# Patient Record
Sex: MALE | Race: BLACK OR AFRICAN AMERICAN | Employment: FULL TIME | URBAN - METROPOLITAN AREA
[De-identification: names, ages, dates, MRNs, and addresses within clinical notes are randomized per-mention and may not be internally consistent; named-entity substitution may affect disease eponyms.]

---

## 2018-12-01 ENCOUNTER — HOSPITAL ENCOUNTER (EMERGENCY)
Age: 48
Discharge: HOME | End: 2018-12-01
Attending: EMERGENCY MEDICINE

## 2018-12-01 VITALS
RESPIRATION RATE: 16 BRPM | WEIGHT: 168.13 LBS | SYSTOLIC BLOOD PRESSURE: 115 MMHG | TEMPERATURE: 98.5 F | DIASTOLIC BLOOD PRESSURE: 74 MMHG | OXYGEN SATURATION: 97 % | HEART RATE: 68 BPM | BODY MASS INDEX: 26.33 KG/M2

## 2018-12-01 DIAGNOSIS — G51.0 BELL'S PALSY: Primary | ICD-10-CM

## 2018-12-01 PROCEDURE — 6370000000 HC RX 637 (ALT 250 FOR IP): Performed by: EMERGENCY MEDICINE

## 2018-12-01 PROCEDURE — 99284 EMERGENCY DEPT VISIT MOD MDM: CPT

## 2018-12-01 PROCEDURE — 86618 LYME DISEASE ANTIBODY: CPT

## 2018-12-01 RX ORDER — PREDNISONE 20 MG/1
60 TABLET ORAL ONCE
Status: COMPLETED | OUTPATIENT
Start: 2018-12-01 | End: 2018-12-01

## 2018-12-01 RX ORDER — PREDNISONE 20 MG/1
60 TABLET ORAL DAILY
Qty: 18 TABLET | Refills: 0 | Status: SHIPPED | OUTPATIENT
Start: 2018-12-01 | End: 2018-12-07

## 2018-12-01 RX ADMIN — PREDNISONE 60 MG: 20 TABLET ORAL at 14:50

## 2018-12-01 NOTE — ED NOTES
Dr. Valery Brady in room at this time. Per MD, pt not a stroke alert.       Kenneth Aguilar RN  12/01/18 DORA Matt  12/01/18 [id number]

## 2018-12-01 NOTE — ED NOTES
Patient given d/c instructions with return verbalization including medication. Emphasis on f/u with neurologist, name given to patient. Reviewed instructions for Bell's Palsy. Emphasis on usinf NS eye drops and to frequently close lid using hand if needed. Pt given eye patch to use at night. Pt to return with worsening s/s. Ambulated to lobby with steady gait.      Layla Lowe RN  12/01/18 [id number]

## 2018-12-04 LAB — LYME, EIA: 0.44 LIV (ref 0–1.2)

## 2018-12-11 ENCOUNTER — OFFICE VISIT (OUTPATIENT)
Dept: INTERNAL MEDICINE CLINIC | Age: 48
End: 2018-12-11

## 2018-12-11 VITALS
BODY MASS INDEX: 26.43 KG/M2 | WEIGHT: 168.4 LBS | RESPIRATION RATE: 16 BRPM | TEMPERATURE: 97 F | DIASTOLIC BLOOD PRESSURE: 82 MMHG | OXYGEN SATURATION: 98 % | HEIGHT: 67 IN | HEART RATE: 68 BPM | SYSTOLIC BLOOD PRESSURE: 122 MMHG

## 2018-12-11 DIAGNOSIS — G51.0 RIGHT-SIDED BELL'S PALSY: ICD-10-CM

## 2018-12-11 PROCEDURE — 99213 OFFICE O/P EST LOW 20 MIN: CPT | Performed by: INTERNAL MEDICINE

## 2018-12-11 RX ORDER — PREDNISONE 20 MG/1
TABLET ORAL
Qty: 42 TABLET | Refills: 0 | Status: SHIPPED | OUTPATIENT
Start: 2018-12-11 | End: 2019-02-20

## 2019-02-20 ENCOUNTER — HOSPITAL ENCOUNTER (EMERGENCY)
Age: 49
Discharge: HOME | End: 2019-02-20
Attending: EMERGENCY MEDICINE

## 2019-02-20 ENCOUNTER — APPOINTMENT (OUTPATIENT)
Dept: GENERAL RADIOLOGY | Age: 49
End: 2019-02-20

## 2019-02-20 VITALS
DIASTOLIC BLOOD PRESSURE: 76 MMHG | SYSTOLIC BLOOD PRESSURE: 131 MMHG | OXYGEN SATURATION: 97 % | RESPIRATION RATE: 15 BRPM | HEIGHT: 67 IN | TEMPERATURE: 97.9 F | BODY MASS INDEX: 26.53 KG/M2 | WEIGHT: 169 LBS | HEART RATE: 77 BPM

## 2019-02-20 DIAGNOSIS — J02.9 ACUTE PHARYNGITIS, UNSPECIFIED ETIOLOGY: Primary | ICD-10-CM

## 2019-02-20 DIAGNOSIS — K21.9 GASTROESOPHAGEAL REFLUX DISEASE, ESOPHAGITIS PRESENCE NOT SPECIFIED: ICD-10-CM

## 2019-02-20 PROCEDURE — 6370000000 HC RX 637 (ALT 250 FOR IP): Performed by: EMERGENCY MEDICINE

## 2019-02-20 PROCEDURE — 70360 X-RAY EXAM OF NECK: CPT

## 2019-02-20 PROCEDURE — 93005 ELECTROCARDIOGRAM TRACING: CPT | Performed by: EMERGENCY MEDICINE

## 2019-02-20 PROCEDURE — 71046 X-RAY EXAM CHEST 2 VIEWS: CPT

## 2019-02-20 PROCEDURE — 99283 EMERGENCY DEPT VISIT LOW MDM: CPT

## 2019-02-20 RX ORDER — AMOXICILLIN 875 MG/1
875 TABLET, COATED ORAL 2 TIMES DAILY
Qty: 14 TABLET | Refills: 0 | Status: SHIPPED | OUTPATIENT
Start: 2019-02-20 | End: 2019-02-27

## 2019-02-20 RX ORDER — FAMOTIDINE 20 MG/1
20 TABLET, FILM COATED ORAL 2 TIMES DAILY
Qty: 60 TABLET | Refills: 0 | Status: SHIPPED | OUTPATIENT
Start: 2019-02-20

## 2019-02-20 RX ADMIN — LIDOCAINE HYDROCHLORIDE: 20 SOLUTION ORAL; TOPICAL at 19:19

## 2019-02-20 ASSESSMENT — PAIN DESCRIPTION - DESCRIPTORS: DESCRIPTORS: SORE

## 2019-02-20 ASSESSMENT — PAIN SCALES - GENERAL: PAINLEVEL_OUTOF10: 3

## 2019-02-20 ASSESSMENT — PAIN DESCRIPTION - FREQUENCY: FREQUENCY: CONTINUOUS

## 2019-02-20 ASSESSMENT — PAIN DESCRIPTION - LOCATION: LOCATION: THROAT

## 2019-02-20 ASSESSMENT — PAIN DESCRIPTION - PAIN TYPE: TYPE: ACUTE PAIN

## 2019-02-21 LAB
EKG ATRIAL RATE: 59 BPM
EKG DIAGNOSIS: NORMAL
EKG P AXIS: 67 DEGREES
EKG P-R INTERVAL: 150 MS
EKG Q-T INTERVAL: 388 MS
EKG QRS DURATION: 88 MS
EKG QTC CALCULATION (BAZETT): 384 MS
EKG R AXIS: 15 DEGREES
EKG T AXIS: 13 DEGREES
EKG VENTRICULAR RATE: 59 BPM

## 2019-02-21 PROCEDURE — 93010 ELECTROCARDIOGRAM REPORT: CPT | Performed by: INTERNAL MEDICINE
